# Patient Record
Sex: MALE | Race: WHITE | NOT HISPANIC OR LATINO | URBAN - METROPOLITAN AREA
[De-identification: names, ages, dates, MRNs, and addresses within clinical notes are randomized per-mention and may not be internally consistent; named-entity substitution may affect disease eponyms.]

---

## 2020-11-04 ENCOUNTER — OFFICE VISIT (OUTPATIENT)
Dept: URBAN - METROPOLITAN AREA CLINIC 35 | Facility: CLINIC | Age: 60
End: 2020-11-04

## 2020-11-04 NOTE — HPI-MIGRATED HPI
;     Surveillance Colonoscopy : Patient is a 60-year-old  male, who presents today for a surveillance colonoscopy. Patient's last colonoscopy performed on 09/11/2014 with Dr. Doran revealed hemorrhoids, polyp x1 w/ benign colonic mucosa with no significant histopathological abnormality, polyp x1 w/ colonic mucosa w/ histological features highly suggestive of a hyperplastic polyp. Admits fhx of colon cancer. Patient denies a family hx of gastric/esophageal cancer/polyps. Patient currently admits bowel movements a day?. Stools are typically formed?. Patient denies melena, blood, or mucus in stool, heartburn, nausea, vomiting, diarrhea, or constipation. ;

## 2020-11-05 ENCOUNTER — OFFICE VISIT (OUTPATIENT)
Dept: URBAN - METROPOLITAN AREA CLINIC 35 | Facility: CLINIC | Age: 60
End: 2020-11-05

## 2020-11-05 VITALS
WEIGHT: 186.8 LBS | DIASTOLIC BLOOD PRESSURE: 75 MMHG | SYSTOLIC BLOOD PRESSURE: 120 MMHG | BODY MASS INDEX: 28.31 KG/M2 | HEIGHT: 68 IN | TEMPERATURE: 96.3 F

## 2020-11-05 PROBLEM — 428283002 HISTORY OF POLYP OF COLON: Status: ACTIVE | Noted: 2020-11-05

## 2020-11-05 RX ORDER — SODIUM, POTASSIUM,MAG SULFATES 17.5-3.13G
ML AS DIRECTED SOLUTION, RECONSTITUTED, ORAL ORAL
Qty: 1 KIT | Refills: 0 | OUTPATIENT
Start: 2020-11-05

## 2020-11-05 RX ORDER — SODIUM PICOSULFATE, MAGNESIUM OXIDE, AND ANHYDROUS CITRIC ACID 10; 3.5; 12 MG/16.1G; G/16.1G; G/16.1G
AS DIRECTED POWDER, METERED ORAL 1
Qty: 1 | Refills: 0 | Status: DISCONTINUED | COMMUNITY
Start: 2014-08-26

## 2020-11-05 NOTE — HPI-MIGRATED HPI
;     Surveillance Colonoscopy : Patient is a 60-year-old  male, who presents today for a surveillance colonoscopy. Patient's last colonoscopy performed on 09/11/2014 with Dr. Doran revealed hemorrhoids, polyp x1 w/ benign colonic mucosa with no significant histopathological abnormality, polyp x1 w/ colonic mucosa w/ histological features highly suggestive of a hyperplastic polyp. Admits fhx of colon cancer. Patient denies a family hx of gastric/esophageal cancer/polyps. Patient currently admits 2 bowel movements a day. Stools are typically formed. Patient denies melena, blood, or mucus in stool, heartburn, nausea, vomiting, diarrhea, or constipation. ;

## 2020-12-02 ENCOUNTER — OFFICE VISIT (OUTPATIENT)
Dept: URBAN - METROPOLITAN AREA SURGERY CENTER 8 | Facility: SURGERY CENTER | Age: 60
End: 2020-12-02

## 2020-12-15 PROBLEM — 68496003: Status: ACTIVE | Noted: 2020-12-14

## 2020-12-15 PROBLEM — 39772007: Status: ACTIVE | Noted: 2020-12-14

## 2020-12-15 PROBLEM — 721704005: Status: ACTIVE | Noted: 2020-12-14

## 2020-12-15 PROBLEM — 430813008: Status: ACTIVE | Noted: 2020-12-14

## 2020-12-15 PROBLEM — 428283002: Status: ACTIVE | Noted: 2020-12-14

## 2020-12-16 ENCOUNTER — DASHBOARD ENCOUNTERS (OUTPATIENT)
Age: 60
End: 2020-12-16

## 2020-12-16 ENCOUNTER — OFFICE VISIT (OUTPATIENT)
Dept: URBAN - METROPOLITAN AREA CLINIC 35 | Facility: CLINIC | Age: 60
End: 2020-12-16

## 2020-12-16 VITALS — HEIGHT: 68 IN

## 2020-12-16 NOTE — HPI-MIGRATED HPI
Post-op OV : After Colonoscopy on -> 12/02/2020, at which time six small polyps were detected and resected. Histology showed they were fragments of tubular adenoma (1) and fragments of hyperplastic polyps (5). Non-bleeding grade II internal and external hemorrhoids were found during retroflexion but not banded. Good quality bowel prep;   Post-op OV : Patient denies -> rectal bleeding, fever, nausea & vomiting since the procedure date;   Post-op OV : Patient -> denies any new changes in his/her health status since last OV;

## 2020-12-16 NOTE — EXAM-MIGRATED EXAMINATIONS
GENERAL APPEARANCE: - alert, in no acute distress, well developed, well nourished;   HEAD: - normocephalic, atraumatic;   EYES: - sclera anicteric bilaterally;   NECK/THYROID: - neck supple, no cervical lymphadenopathy, no lymphadenopathy, no thyroid nodules, no thyromegaly, trachea midline;   NEUROLOGIC: - cranial nerves 2-12 grossly intact;   PSYCH: - cooperative with exam, mood/affect full range;

## 2020-12-17 ENCOUNTER — OFFICE VISIT (OUTPATIENT)
Dept: URBAN - METROPOLITAN AREA CLINIC 37 | Facility: CLINIC | Age: 60
End: 2020-12-17

## 2020-12-28 ENCOUNTER — OFFICE VISIT (OUTPATIENT)
Dept: URBAN - METROPOLITAN AREA CLINIC 35 | Facility: CLINIC | Age: 60
End: 2020-12-28